# Patient Record
Sex: FEMALE | Race: WHITE | Employment: FULL TIME | URBAN - METROPOLITAN AREA
[De-identification: names, ages, dates, MRNs, and addresses within clinical notes are randomized per-mention and may not be internally consistent; named-entity substitution may affect disease eponyms.]

---

## 2017-05-04 ENCOUNTER — HOSPITAL ENCOUNTER (EMERGENCY)
Age: 27
Discharge: HOME | End: 2017-05-04
Attending: EMERGENCY MEDICINE
Payer: COMMERCIAL

## 2017-05-04 VITALS
DIASTOLIC BLOOD PRESSURE: 65 MMHG | SYSTOLIC BLOOD PRESSURE: 107 MMHG | HEART RATE: 54 BPM | RESPIRATION RATE: 18 BRPM | OXYGEN SATURATION: 99 % | TEMPERATURE: 97 F

## 2017-05-04 DIAGNOSIS — R10.12 ABDOMINAL PAIN, LEFT UPPER QUADRANT: Primary | ICD-10-CM

## 2017-05-04 LAB
-: ABNORMAL
ABSOLUTE EOS #: 0 K/UL (ref 0–0.4)
ABSOLUTE LYMPH #: 0.9 K/UL (ref 1–4.8)
ABSOLUTE MONO #: 0.3 K/UL (ref 0.1–1.2)
ALBUMIN SERPL-MCNC: 4.6 G/DL (ref 3.5–5.2)
ALBUMIN/GLOBULIN RATIO: 1.8 (ref 1–2.5)
ALP BLD-CCNC: 68 U/L (ref 35–104)
ALT SERPL-CCNC: <5 U/L (ref 5–33)
AMORPHOUS: ABNORMAL
ANION GAP SERPL CALCULATED.3IONS-SCNC: 14 MMOL/L (ref 9–17)
AST SERPL-CCNC: 14 U/L
BACTERIA: ABNORMAL
BASOPHILS # BLD: 0 %
BASOPHILS ABSOLUTE: 0 K/UL (ref 0–0.2)
BILIRUB SERPL-MCNC: 0.92 MG/DL (ref 0.3–1.2)
BILIRUBIN DIRECT: 0.22 MG/DL
BILIRUBIN URINE: NEGATIVE
BILIRUBIN, INDIRECT: 0.7 MG/DL (ref 0–1)
BUN BLDV-MCNC: 15 MG/DL (ref 6–20)
BUN/CREAT BLD: NORMAL (ref 9–20)
CALCIUM SERPL-MCNC: 9.7 MG/DL (ref 8.6–10.4)
CASTS UA: ABNORMAL /LPF (ref 0–8)
CHLORIDE BLD-SCNC: 98 MMOL/L (ref 98–107)
CO2: 26 MMOL/L (ref 20–31)
COLOR: YELLOW
COMMENT UA: ABNORMAL
CREAT SERPL-MCNC: 0.8 MG/DL (ref 0.5–0.9)
CRYSTALS, UA: ABNORMAL /HPF
DIFFERENTIAL TYPE: ABNORMAL
EOSINOPHILS RELATIVE PERCENT: 1 %
EPITHELIAL CELLS UA: ABNORMAL /HPF (ref 0–5)
GFR AFRICAN AMERICAN: >60 ML/MIN
GFR NON-AFRICAN AMERICAN: >60 ML/MIN
GFR SERPL CREATININE-BSD FRML MDRD: NORMAL ML/MIN/{1.73_M2}
GFR SERPL CREATININE-BSD FRML MDRD: NORMAL ML/MIN/{1.73_M2}
GLOBULIN: ABNORMAL G/DL (ref 1.5–3.8)
GLUCOSE BLD-MCNC: 94 MG/DL (ref 70–99)
GLUCOSE URINE: NEGATIVE
HCT VFR BLD CALC: 41.1 % (ref 36–46)
HEMOGLOBIN: 14.2 G/DL (ref 12–16)
KETONES, URINE: NEGATIVE
LEUKOCYTE ESTERASE, URINE: ABNORMAL
LIPASE: 76 U/L (ref 13–60)
LYMPHOCYTES # BLD: 15 %
MCH RBC QN AUTO: 32.2 PG (ref 26–34)
MCHC RBC AUTO-ENTMCNC: 34.6 G/DL (ref 31–37)
MCV RBC AUTO: 92.9 FL (ref 80–100)
MONOCYTES # BLD: 6 %
MUCUS: ABNORMAL
NITRITE, URINE: NEGATIVE
OTHER OBSERVATIONS UA: ABNORMAL
PDW BLD-RTO: 13.9 % (ref 12.5–15.4)
PH UA: 7.5 (ref 5–8)
PLATELET # BLD: 231 K/UL (ref 140–450)
PLATELET ESTIMATE: ABNORMAL
PMV BLD AUTO: 8 FL (ref 6–12)
POTASSIUM SERPL-SCNC: 4.3 MMOL/L (ref 3.7–5.3)
PROTEIN UA: NEGATIVE
RBC # BLD: 4.43 M/UL (ref 4–5.2)
RBC # BLD: ABNORMAL 10*6/UL
RBC UA: ABNORMAL /HPF (ref 0–4)
RENAL EPITHELIAL, UA: ABNORMAL /HPF
SEG NEUTROPHILS: 78 %
SEGMENTED NEUTROPHILS ABSOLUTE COUNT: 4.6 K/UL (ref 1.8–7.7)
SODIUM BLD-SCNC: 138 MMOL/L (ref 135–144)
SPECIFIC GRAVITY UA: 1 (ref 1–1.03)
TOTAL PROTEIN: 7.2 G/DL (ref 6.4–8.3)
TRICHOMONAS: ABNORMAL
TURBIDITY: CLEAR
URINE HGB: NEGATIVE
UROBILINOGEN, URINE: NORMAL
WBC # BLD: 5.9 K/UL (ref 3.5–11)
WBC # BLD: ABNORMAL 10*3/UL
WBC UA: ABNORMAL /HPF (ref 0–5)
YEAST: ABNORMAL

## 2017-05-04 PROCEDURE — 6370000000 HC RX 637 (ALT 250 FOR IP): Performed by: EMERGENCY MEDICINE

## 2017-05-04 PROCEDURE — 80048 BASIC METABOLIC PNL TOTAL CA: CPT

## 2017-05-04 PROCEDURE — 80076 HEPATIC FUNCTION PANEL: CPT

## 2017-05-04 PROCEDURE — 99284 EMERGENCY DEPT VISIT MOD MDM: CPT

## 2017-05-04 PROCEDURE — 81001 URINALYSIS AUTO W/SCOPE: CPT

## 2017-05-04 PROCEDURE — 83690 ASSAY OF LIPASE: CPT

## 2017-05-04 PROCEDURE — 87086 URINE CULTURE/COLONY COUNT: CPT

## 2017-05-04 PROCEDURE — 85025 COMPLETE CBC W/AUTO DIFF WBC: CPT

## 2017-05-04 RX ORDER — METOCLOPRAMIDE 10 MG/1
10 TABLET ORAL
Qty: 30 TABLET | Refills: 0 | Status: SHIPPED | OUTPATIENT
Start: 2017-05-04 | End: 2021-12-27

## 2017-05-04 RX ORDER — METOCLOPRAMIDE 10 MG/1
10 TABLET ORAL
Status: DISCONTINUED | OUTPATIENT
Start: 2017-05-04 | End: 2017-05-04 | Stop reason: HOSPADM

## 2017-05-04 RX ORDER — CEPHALEXIN 250 MG/1
250 CAPSULE ORAL 4 TIMES DAILY
Qty: 28 CAPSULE | Refills: 0 | Status: SHIPPED | OUTPATIENT
Start: 2017-05-04 | End: 2021-12-27

## 2017-05-04 RX ADMIN — METOCLOPRAMIDE 10 MG: 10 TABLET ORAL at 16:22

## 2017-05-04 ASSESSMENT — ENCOUNTER SYMPTOMS
CONSTIPATION: 0
SHORTNESS OF BREATH: 0
SORE THROAT: 0
ABDOMINAL DISTENTION: 0
VOMITING: 0
ABDOMINAL PAIN: 1
NAUSEA: 0
COUGH: 0
EYE PAIN: 0
BLOOD IN STOOL: 0
DIARRHEA: 0
EYE DISCHARGE: 0
ANAL BLEEDING: 0

## 2017-05-04 ASSESSMENT — PAIN DESCRIPTION - DESCRIPTORS: DESCRIPTORS: PRESSURE;DISCOMFORT

## 2017-05-04 ASSESSMENT — PAIN DESCRIPTION - LOCATION: LOCATION: ABDOMEN

## 2017-05-04 ASSESSMENT — PAIN SCALES - GENERAL: PAINLEVEL_OUTOF10: 7

## 2017-05-06 LAB
CULTURE: NORMAL
CULTURE: NORMAL
Lab: NORMAL
SPECIMEN DESCRIPTION: NORMAL
STATUS: NORMAL

## 2022-06-17 ENCOUNTER — HOSPITAL ENCOUNTER (OUTPATIENT)
Age: 32
Discharge: HOME | End: 2022-06-17
Payer: COMMERCIAL

## 2022-06-17 DIAGNOSIS — Z00.00 WELL ADULT EXAM: ICD-10-CM

## 2022-06-17 LAB
ABSOLUTE EOS #: 0.09 K/UL (ref 0–0.44)
ABSOLUTE IMMATURE GRANULOCYTE: <0.03 K/UL (ref 0–0.3)
ABSOLUTE LYMPH #: 1.5 K/UL (ref 1.1–3.7)
ABSOLUTE MONO #: 0.43 K/UL (ref 0.1–1.2)
ALBUMIN SERPL-MCNC: 4.4 G/DL (ref 3.5–5.2)
ALBUMIN/GLOBULIN RATIO: 1.8 (ref 1–2.5)
ALP BLD-CCNC: 64 U/L (ref 35–104)
ALT SERPL-CCNC: 11 U/L (ref 5–33)
ANION GAP SERPL CALCULATED.3IONS-SCNC: 15 MMOL/L (ref 9–17)
AST SERPL-CCNC: 15 U/L
BASOPHILS # BLD: 1 % (ref 0–2)
BASOPHILS ABSOLUTE: 0.04 K/UL (ref 0–0.2)
BILIRUB SERPL-MCNC: 0.31 MG/DL (ref 0.3–1.2)
BUN BLDV-MCNC: 11 MG/DL (ref 6–20)
CALCIUM SERPL-MCNC: 9.3 MG/DL (ref 8.6–10.4)
CHLORIDE BLD-SCNC: 103 MMOL/L (ref 98–107)
CHOLESTEROL/HDL RATIO: 2.7
CHOLESTEROL: 174 MG/DL
CO2: 21 MMOL/L (ref 20–31)
CREAT SERPL-MCNC: 0.81 MG/DL (ref 0.5–0.9)
EOSINOPHILS RELATIVE PERCENT: 2 % (ref 1–4)
GFR AFRICAN AMERICAN: >60 ML/MIN
GFR NON-AFRICAN AMERICAN: >60 ML/MIN
GFR SERPL CREATININE-BSD FRML MDRD: NORMAL ML/MIN/{1.73_M2}
GLUCOSE FASTING: 76 MG/DL (ref 70–99)
HCT VFR BLD CALC: 35.3 % (ref 36.3–47.1)
HDLC SERPL-MCNC: 65 MG/DL
HEMOGLOBIN: 11.3 G/DL (ref 11.9–15.1)
IMMATURE GRANULOCYTES: 0 %
LDL CHOLESTEROL: 95 MG/DL (ref 0–130)
LYMPHOCYTES # BLD: 28 % (ref 24–43)
MCH RBC QN AUTO: 27.8 PG (ref 25.2–33.5)
MCHC RBC AUTO-ENTMCNC: 32 G/DL (ref 28.4–34.8)
MCV RBC AUTO: 86.7 FL (ref 82.6–102.9)
MONOCYTES # BLD: 8 % (ref 3–12)
NRBC AUTOMATED: 0 PER 100 WBC
PDW BLD-RTO: 13.2 % (ref 11.8–14.4)
PLATELET # BLD: 301 K/UL (ref 138–453)
PMV BLD AUTO: 10.7 FL (ref 8.1–13.5)
POTASSIUM SERPL-SCNC: 4.3 MMOL/L (ref 3.7–5.3)
RBC # BLD: 4.07 M/UL (ref 3.95–5.11)
SEG NEUTROPHILS: 61 % (ref 36–65)
SEGMENTED NEUTROPHILS ABSOLUTE COUNT: 3.36 K/UL (ref 1.5–8.1)
SODIUM BLD-SCNC: 139 MMOL/L (ref 135–144)
TOTAL PROTEIN: 6.9 G/DL (ref 6.4–8.3)
TRIGL SERPL-MCNC: 69 MG/DL
TSH SERPL DL<=0.05 MIU/L-ACNC: 2.71 UIU/ML (ref 0.3–5)
VITAMIN B-12: 404 PG/ML (ref 232–1245)
VITAMIN D 25-HYDROXY: 25.7 NG/ML
WBC # BLD: 5.4 K/UL (ref 3.5–11.3)

## 2022-06-17 PROCEDURE — 82607 VITAMIN B-12: CPT

## 2022-06-17 PROCEDURE — 84436 ASSAY OF TOTAL THYROXINE: CPT

## 2022-06-17 PROCEDURE — 85025 COMPLETE CBC W/AUTO DIFF WBC: CPT

## 2022-06-17 PROCEDURE — 80061 LIPID PANEL: CPT

## 2022-06-17 PROCEDURE — 80053 COMPREHEN METABOLIC PANEL: CPT

## 2022-06-17 PROCEDURE — 36415 COLL VENOUS BLD VENIPUNCTURE: CPT

## 2022-06-17 PROCEDURE — 84443 ASSAY THYROID STIM HORMONE: CPT

## 2022-06-17 PROCEDURE — 82306 VITAMIN D 25 HYDROXY: CPT

## 2022-06-18 LAB — T4 TOTAL: 6.3 UG/DL (ref 4.5–10.9)

## 2022-08-05 ENCOUNTER — HOSPITAL ENCOUNTER (OUTPATIENT)
Age: 32
Discharge: HOME | End: 2022-08-05
Payer: COMMERCIAL

## 2022-08-05 DIAGNOSIS — D50.9 IRON DEFICIENCY ANEMIA, UNSPECIFIED IRON DEFICIENCY ANEMIA TYPE: ICD-10-CM

## 2022-08-05 LAB
ABSOLUTE EOS #: <0.03 K/UL (ref 0–0.44)
ABSOLUTE IMMATURE GRANULOCYTE: 0.03 K/UL (ref 0–0.3)
ABSOLUTE LYMPH #: 1.11 K/UL (ref 1.1–3.7)
ABSOLUTE MONO #: 0.2 K/UL (ref 0.1–1.2)
BASOPHILS # BLD: 0 % (ref 0–2)
BASOPHILS ABSOLUTE: <0.03 K/UL (ref 0–0.2)
EOSINOPHILS RELATIVE PERCENT: 0 % (ref 1–4)
HCT VFR BLD CALC: 38.3 % (ref 36.3–47.1)
HEMOGLOBIN: 12.1 G/DL (ref 11.9–15.1)
IMMATURE GRANULOCYTES: 0 %
LYMPHOCYTES # BLD: 12 % (ref 24–43)
MCH RBC QN AUTO: 27.1 PG (ref 25.2–33.5)
MCHC RBC AUTO-ENTMCNC: 31.6 G/DL (ref 28.4–34.8)
MCV RBC AUTO: 85.7 FL (ref 82.6–102.9)
MONOCYTES # BLD: 2 % (ref 3–12)
NRBC AUTOMATED: 0 PER 100 WBC
PDW BLD-RTO: 13.6 % (ref 11.8–14.4)
PLATELET # BLD: 360 K/UL (ref 138–453)
PMV BLD AUTO: 10.8 FL (ref 8.1–13.5)
RBC # BLD: 4.47 M/UL (ref 3.95–5.11)
SEG NEUTROPHILS: 86 % (ref 36–65)
SEGMENTED NEUTROPHILS ABSOLUTE COUNT: 8 K/UL (ref 1.5–8.1)
WBC # BLD: 9.4 K/UL (ref 3.5–11.3)

## 2022-08-05 PROCEDURE — 83540 ASSAY OF IRON: CPT

## 2022-08-05 PROCEDURE — 36415 COLL VENOUS BLD VENIPUNCTURE: CPT

## 2022-08-05 PROCEDURE — 82728 ASSAY OF FERRITIN: CPT

## 2022-08-05 PROCEDURE — 83550 IRON BINDING TEST: CPT

## 2022-08-05 PROCEDURE — 85025 COMPLETE CBC W/AUTO DIFF WBC: CPT

## 2022-08-05 PROCEDURE — 82746 ASSAY OF FOLIC ACID SERUM: CPT

## 2022-08-05 PROCEDURE — 82607 VITAMIN B-12: CPT

## 2022-08-06 LAB
FERRITIN: 12 NG/ML (ref 13–150)
FOLATE: 13.2 NG/ML
IRON SATURATION: 7 % (ref 20–55)
IRON: 28 UG/DL (ref 37–145)
TOTAL IRON BINDING CAPACITY: 403 UG/DL (ref 250–450)
UNSATURATED IRON BINDING CAPACITY: 375 UG/DL (ref 112–347)
VITAMIN B-12: 453 PG/ML (ref 232–1245)

## 2022-11-15 ENCOUNTER — OFFICE VISIT (OUTPATIENT)
Dept: PODIATRY | Age: 32
End: 2022-11-15
Payer: COMMERCIAL

## 2022-11-15 VITALS — HEIGHT: 68 IN | BODY MASS INDEX: 28.79 KG/M2 | WEIGHT: 190 LBS

## 2022-11-15 DIAGNOSIS — S90.211A SUBUNGUAL HEMATOMA OF GREAT TOE OF RIGHT FOOT, INITIAL ENCOUNTER: Primary | ICD-10-CM

## 2022-11-15 DIAGNOSIS — L60.1 ONYCHOLYSIS: ICD-10-CM

## 2022-11-15 PROCEDURE — G8484 FLU IMMUNIZE NO ADMIN: HCPCS | Performed by: PODIATRIST

## 2022-11-15 PROCEDURE — 99203 OFFICE O/P NEW LOW 30 MIN: CPT | Performed by: PODIATRIST

## 2022-11-15 PROCEDURE — G8427 DOCREV CUR MEDS BY ELIG CLIN: HCPCS | Performed by: PODIATRIST

## 2022-11-15 PROCEDURE — G8419 CALC BMI OUT NRM PARAM NOF/U: HCPCS | Performed by: PODIATRIST

## 2022-11-15 PROCEDURE — 1036F TOBACCO NON-USER: CPT | Performed by: PODIATRIST

## 2022-11-15 NOTE — PROGRESS NOTES
[address].  Dept: [phone]    NEW PATIENT PROGRESS NOTE  Date of patient's visit: 11/15/2022  Patient's Name:  Sallie Henderson YOB: 1990            Patient Care Team:  Good Grant MD as PCP - General (Family Medicine)  Good Grant MD as PCP - [facility]        Chief Complaint   Patient presents with    New Patient     Establish care    Toe Pain     Right great toe    Nail Problem     Present for 1 month         HPI:   Sallie Henderson is a 29 y.o. female who presents to the office today complaining of right great toe pain and nail issues. States she ran a marathon and has noticed discoloration of nails. Symptoms began 1 month(s) ago. Patient relates pain is Absent . Pain is rated 0 out of 10 and is described as none. Treatments prior to today's visit include: none. Currently denies F/C/N/V. Pt's primary care physician is Good Grant MD last seen may 27 2022     No Known Allergies    History reviewed. No pertinent past medical history. Prior to Admission medications    Medication Sig Start Date End Date Taking? Authorizing Provider   vitamin D (ERGOCALCIFEROL) 1.25 MG (71917 UT) CAPS capsule Take 1 capsule by mouth once a week 6/21/22  Yes Good Grant MD       History reviewed. No pertinent surgical history. Family History   Problem Relation Age of Onset    Arthritis Father     High Cholesterol Father        Social History     Tobacco Use    Smoking status: Never    Smokeless tobacco: Never   Substance Use Topics    Alcohol use:  Yes     Alcohol/week: 0.0 standard drinks     Comment: very rare occasions       Review of Systems    Review of Systems:   History obtained from chart review and the patient  General ROS: negative for - chills, fatigue, fever, night sweats or weight gain  Constitutional: Negative for chills, diaphoresis, fatigue, fever and unexpected weight change. Musculoskeletal: Positive for arthralgias, gait problem and joint swelling. Neurological ROS: negative for - behavioral changes, confusion, headaches or seizures. Negative for weakness and numbness. Dermatological ROS: negative for - mole changes, rash  Cardiovascular: Negative for leg swelling. Gastrointestinal: Negative for constipation, diarrhea, nausea and vomiting. Lower Extremity Physical Examination:   Vitals: There were no vitals filed for this visit. General: AAO x 3 in NAD. Dermatologic Exam:  Right hallux and 2nd digit nail and left hallux nails is dark in color with subungual hematoma. Musculoskeletal:     1st MPJ ROM decreased, Bilateral.  Muscle strength 5/5, Bilateral.  Pain present upon palpation of right hallux. Medial longitudinal arch, Bilateral WNL. Ankle ROM WNL,Bilateral.    Dorsally contracted digits absent digits 1-5 Bilateral.     Vascular: DP and PT pulses palpable 2/4, Bilateral.  CFT <3 seconds, Bilateral.  Hair growth present to the level of the digits, Bilateral.  Edema absent, Bilateral.  Varicosities absent, Bilateral. Erythema absent, Bilateral    Neurological: Sensation intact to light touch to level of digits, Bilateral.  Protective sensation intact 10/10 sites via 5.07/10g [place]-Christopher Monofilament, Bilateral.  negative Tinel's, Bilateral.  negative Valleix sign, Bilateral.      Integument: Warm, dry, supple, Bilateral.  Open lesion absent, Bilateral.  Interdigital maceration absent to web spaces 1-4, Bilateral.  Nails are normal in length, thickness and color 1-5 bilateral.  Fissures absent, Bilateral.     Asessment: Patient is a 29 y.o. female with:    Diagnosis Orders   1. Subungual hematoma of great toe of right foot, initial encounter        2. Onycholysis              Plan: Patient examined and evaluated. Current condition and treatment options discussed in detail.   Discussed conservative and surgical options with the patient. Advised pt to avoid narrow shoes. Pt to monitor for infection. Advised pt to consider nail avulsion at next visit if symptoms persist or worsen. Pt to monitor for increased signs of infection such as erythema, edema and drainage. All labs were reviewed and all imagining including the above findings were reviewed PRIOR to the patients arrival and with the patient today. Previous patient encounter was reviewed. Encounters from the patients other medical providers were reviewed and noted. Time was spent educating the patient on proper care of the feet and ankles. All the above diagnosis were addressed at todays visit and all questions were answered. A total of 30 minutes was spent with this patients encounter which included charting after the patients visit      Verbal and written instructions given to patient. Contact office with any questions/problems/concerns.   RTC in PRN  11/15/2022    Electronically signed by Gema Rodriguez DPM on 11/15/2022 at 3:14 PM  11/15/2022

## 2023-06-19 ENCOUNTER — HOSPITAL ENCOUNTER (OUTPATIENT)
Age: 33
Discharge: HOME | End: 2023-06-19
Payer: COMMERCIAL

## 2023-06-19 DIAGNOSIS — Z00.00 WELL ADULT EXAM: ICD-10-CM

## 2023-06-19 DIAGNOSIS — D50.8 OTHER IRON DEFICIENCY ANEMIA: ICD-10-CM

## 2023-06-19 LAB
25(OH)D3 SERPL-MCNC: 39.5 NG/ML
ALBUMIN SERPL-MCNC: 4.3 G/DL (ref 3.5–5.2)
ALBUMIN/GLOB SERPL: 1.7 {RATIO} (ref 1–2.5)
ALP SERPL-CCNC: 68 U/L (ref 35–104)
ALT SERPL-CCNC: 13 U/L (ref 5–33)
ANION GAP SERPL CALCULATED.3IONS-SCNC: 10 MMOL/L (ref 9–17)
AST SERPL-CCNC: 18 U/L
BASOPHILS # BLD: 0.04 K/UL (ref 0–0.2)
BASOPHILS NFR BLD: 1 % (ref 0–2)
BILIRUB SERPL-MCNC: 0.6 MG/DL (ref 0.3–1.2)
BUN SERPL-MCNC: 9 MG/DL (ref 6–20)
CALCIUM SERPL-MCNC: 9.6 MG/DL (ref 8.6–10.4)
CHLORIDE SERPL-SCNC: 102 MMOL/L (ref 98–107)
CHOLEST SERPL-MCNC: 176 MG/DL
CHOLESTEROL/HDL RATIO: 2.9
CO2 SERPL-SCNC: 25 MMOL/L (ref 20–31)
CREAT SERPL-MCNC: 0.91 MG/DL (ref 0.5–0.9)
EOSINOPHIL # BLD: 0.09 K/UL (ref 0–0.44)
EOSINOPHILS RELATIVE PERCENT: 1 % (ref 1–4)
ERYTHROCYTE [DISTWIDTH] IN BLOOD BY AUTOMATED COUNT: 12 % (ref 11.8–14.4)
FERRITIN SERPL-MCNC: 28 NG/ML (ref 13–150)
GFR SERPL CREATININE-BSD FRML MDRD: >60 ML/MIN/1.73M2
GLUCOSE P FAST SERPL-MCNC: 79 MG/DL (ref 70–99)
HCT VFR BLD AUTO: 40.8 % (ref 36.3–47.1)
HDLC SERPL-MCNC: 61 MG/DL
HGB BLD-MCNC: 13.9 G/DL (ref 11.9–15.1)
IMM GRANULOCYTES # BLD AUTO: <0.03 K/UL (ref 0–0.3)
IMM GRANULOCYTES NFR BLD: 0 %
IRON SATN MFR SERPL: 28 % (ref 20–55)
IRON SERPL-MCNC: 95 UG/DL (ref 37–145)
LDLC SERPL CALC-MCNC: 94 MG/DL (ref 0–130)
LYMPHOCYTES # BLD: 28 % (ref 24–43)
LYMPHOCYTES NFR BLD: 1.84 K/UL (ref 1.1–3.7)
MCH RBC QN AUTO: 31.2 PG (ref 25.2–33.5)
MCHC RBC AUTO-ENTMCNC: 34.1 G/DL (ref 28.4–34.8)
MCV RBC AUTO: 91.5 FL (ref 82.6–102.9)
MONOCYTES NFR BLD: 0.45 K/UL (ref 0.1–1.2)
MONOCYTES NFR BLD: 7 % (ref 3–12)
NEUTROPHILS NFR BLD: 63 % (ref 36–65)
NEUTS SEG NFR BLD: 4.17 K/UL (ref 1.5–8.1)
NRBC AUTOMATED: 0 PER 100 WBC
PLATELET # BLD AUTO: 284 K/UL (ref 138–453)
PMV BLD AUTO: 10.5 FL (ref 8.1–13.5)
POTASSIUM SERPL-SCNC: 4.6 MMOL/L (ref 3.7–5.3)
PROT SERPL-MCNC: 6.9 G/DL (ref 6.4–8.3)
RBC # BLD AUTO: 4.46 M/UL (ref 3.95–5.11)
SODIUM SERPL-SCNC: 137 MMOL/L (ref 135–144)
T4 FREE SERPL-MCNC: 1.2 NG/DL (ref 0.9–1.7)
TIBC SERPL-MCNC: 340 UG/DL (ref 250–450)
TRIGL SERPL-MCNC: 106 MG/DL
TSH SERPL-MCNC: 2.64 UIU/ML (ref 0.3–5)
UNSATURATED IRON BINDING CAPACITY: 245 UG/DL (ref 112–347)
VIT B12 SERPL-MCNC: 597 PG/ML (ref 232–1245)
WBC OTHER # BLD: 6.6 K/UL (ref 3.5–11.3)

## 2023-06-19 PROCEDURE — 83540 ASSAY OF IRON: CPT

## 2023-06-19 PROCEDURE — 84439 ASSAY OF FREE THYROXINE: CPT

## 2023-06-19 PROCEDURE — 80061 LIPID PANEL: CPT

## 2023-06-19 PROCEDURE — 36415 COLL VENOUS BLD VENIPUNCTURE: CPT

## 2023-06-19 PROCEDURE — 85027 COMPLETE CBC AUTOMATED: CPT

## 2023-06-19 PROCEDURE — 82607 VITAMIN B-12: CPT

## 2023-06-19 PROCEDURE — 80053 COMPREHEN METABOLIC PANEL: CPT

## 2023-06-19 PROCEDURE — 82728 ASSAY OF FERRITIN: CPT

## 2023-06-19 PROCEDURE — 83550 IRON BINDING TEST: CPT

## 2023-06-19 PROCEDURE — 84443 ASSAY THYROID STIM HORMONE: CPT

## 2023-06-19 PROCEDURE — 82306 VITAMIN D 25 HYDROXY: CPT

## 2023-07-05 ENCOUNTER — HOSPITAL ENCOUNTER (OUTPATIENT)
Age: 33
Discharge: HOME | End: 2023-07-05
Payer: COMMERCIAL

## 2023-07-05 ENCOUNTER — E-VISIT (OUTPATIENT)
Dept: PRIMARY CARE CLINIC | Age: 33
End: 2023-07-05
Payer: COMMERCIAL

## 2023-07-05 DIAGNOSIS — R30.0 DYSURIA: ICD-10-CM

## 2023-07-05 DIAGNOSIS — R30.0 DYSURIA: Primary | ICD-10-CM

## 2023-07-05 PROCEDURE — 99423 OL DIG E/M SVC 21+ MIN: CPT | Performed by: NURSE PRACTITIONER

## 2023-07-05 PROCEDURE — 86403 PARTICLE AGGLUT ANTBDY SCRN: CPT

## 2023-07-05 PROCEDURE — 87086 URINE CULTURE/COLONY COUNT: CPT

## 2023-07-05 NOTE — PROGRESS NOTES
Sandi Brown (1990) initiated an asynchronous digital communication through [address] [place] [address]. HPI: per patient questionnaire     Exam: not applicable    Diagnoses and all orders for this visit:  Diagnoses and all orders for this visit:    Dysuria  -     Culture, Urine; Future    Patient had similar symptoms a few months ago that went away on their own. Recommend urine culture. We will follow-up with results. Increase oral fluids. Tylenol, Motrin or Azo for pain. Follow-up PCP as needed    Time: EV3 - 21 or more minutes were spent on the digital evaluation and management of this patient.  22 min    Mariaa Martinez, APRN - CNP

## 2023-07-06 ENCOUNTER — TELEPHONE (OUTPATIENT)
Dept: PRIMARY CARE CLINIC | Age: 33
End: 2023-07-06

## 2023-07-06 LAB
MICROORGANISM SPEC CULT: ABNORMAL
SPECIMEN DESCRIPTION: ABNORMAL

## 2023-07-06 RX ORDER — CEPHALEXIN 500 MG/1
500 CAPSULE ORAL 2 TIMES DAILY
Qty: 10 CAPSULE | Refills: 0 | Status: SHIPPED | OUTPATIENT
Start: 2023-07-06 | End: 2023-07-11

## 2023-07-06 NOTE — TELEPHONE ENCOUNTER
Patient called stating she had a E visit done yesterday with Shima Weber and labs were ordered. Patient would like results for labs please.

## 2024-06-24 ENCOUNTER — HOSPITAL ENCOUNTER (OUTPATIENT)
Age: 34
Discharge: HOME | End: 2024-06-24
Payer: COMMERCIAL

## 2024-06-24 DIAGNOSIS — Z00.00 WELL ADULT EXAM: ICD-10-CM

## 2024-06-24 LAB
25(OH)D3 SERPL-MCNC: 28 NG/ML (ref 30–100)
ALBUMIN SERPL-MCNC: 4.6 G/DL (ref 3.5–5.2)
ALBUMIN/GLOB SERPL: 2 {RATIO} (ref 1–2.5)
ALP SERPL-CCNC: 74 U/L (ref 35–104)
ALT SERPL-CCNC: 12 U/L (ref 10–35)
ANION GAP SERPL CALCULATED.3IONS-SCNC: 8 MMOL/L (ref 9–16)
AST SERPL-CCNC: 23 U/L (ref 10–35)
BASOPHILS # BLD: 0.04 K/UL (ref 0–0.2)
BASOPHILS NFR BLD: 1 % (ref 0–2)
BILIRUB SERPL-MCNC: 0.5 MG/DL (ref 0–1.2)
BUN SERPL-MCNC: 9 MG/DL (ref 6–20)
CALCIUM SERPL-MCNC: 9.3 MG/DL (ref 8.6–10.4)
CHLORIDE SERPL-SCNC: 103 MMOL/L (ref 98–107)
CHOLEST SERPL-MCNC: 168 MG/DL (ref 0–199)
CHOLESTEROL/HDL RATIO: 3
CO2 SERPL-SCNC: 27 MMOL/L (ref 20–31)
CREAT SERPL-MCNC: 0.9 MG/DL (ref 0.5–0.9)
EOSINOPHIL # BLD: 0.12 K/UL (ref 0–0.44)
EOSINOPHILS RELATIVE PERCENT: 2 % (ref 1–4)
ERYTHROCYTE [DISTWIDTH] IN BLOOD BY AUTOMATED COUNT: 11.9 % (ref 11.8–14.4)
GFR, ESTIMATED: 86 ML/MIN/1.73M2
GLUCOSE P FAST SERPL-MCNC: 79 MG/DL (ref 74–99)
HCT VFR BLD AUTO: 41.9 % (ref 36.3–47.1)
HDLC SERPL-MCNC: 65 MG/DL
HGB BLD-MCNC: 14.4 G/DL (ref 11.9–15.1)
IMM GRANULOCYTES # BLD AUTO: <0.03 K/UL (ref 0–0.3)
IMM GRANULOCYTES NFR BLD: 0 %
LDLC SERPL CALC-MCNC: 88 MG/DL (ref 0–100)
LYMPHOCYTES NFR BLD: 2.07 K/UL (ref 1.1–3.7)
LYMPHOCYTES RELATIVE PERCENT: 31 % (ref 24–43)
MCH RBC QN AUTO: 30.9 PG (ref 25.2–33.5)
MCHC RBC AUTO-ENTMCNC: 34.4 G/DL (ref 28.4–34.8)
MCV RBC AUTO: 89.9 FL (ref 82.6–102.9)
MONOCYTES NFR BLD: 0.54 K/UL (ref 0.1–1.2)
MONOCYTES NFR BLD: 8 % (ref 3–12)
NEUTROPHILS NFR BLD: 58 % (ref 36–65)
NEUTS SEG NFR BLD: 3.87 K/UL (ref 1.5–8.1)
NRBC BLD-RTO: 0 PER 100 WBC
PLATELET # BLD AUTO: 254 K/UL (ref 138–453)
PMV BLD AUTO: 10.3 FL (ref 8.1–13.5)
POTASSIUM SERPL-SCNC: 4.1 MMOL/L (ref 3.7–5.3)
PROT SERPL-MCNC: 7.1 G/DL (ref 6.6–8.7)
RBC # BLD AUTO: 4.66 M/UL (ref 3.95–5.11)
SODIUM SERPL-SCNC: 138 MMOL/L (ref 136–145)
T4 SERPL-MCNC: 8 UG/DL (ref 4.5–11.7)
TRIGL SERPL-MCNC: 78 MG/DL
TSH SERPL DL<=0.05 MIU/L-ACNC: 2.33 UIU/ML (ref 0.27–4.2)
VIT B12 SERPL-MCNC: 562 PG/ML (ref 232–1245)
VLDLC SERPL CALC-MCNC: 16 MG/DL
WBC OTHER # BLD: 6.7 K/UL (ref 3.5–11.3)

## 2024-06-24 PROCEDURE — 80061 LIPID PANEL: CPT

## 2024-06-24 PROCEDURE — 82607 VITAMIN B-12: CPT

## 2024-06-24 PROCEDURE — 80053 COMPREHEN METABOLIC PANEL: CPT

## 2024-06-24 PROCEDURE — 84436 ASSAY OF TOTAL THYROXINE: CPT

## 2024-06-24 PROCEDURE — 84443 ASSAY THYROID STIM HORMONE: CPT

## 2024-06-24 PROCEDURE — 82306 VITAMIN D 25 HYDROXY: CPT

## 2024-06-24 PROCEDURE — 36415 COLL VENOUS BLD VENIPUNCTURE: CPT

## 2024-06-24 PROCEDURE — 85025 COMPLETE CBC W/AUTO DIFF WBC: CPT

## 2025-07-08 ENCOUNTER — OFFICE VISIT (OUTPATIENT)
Age: 35
End: 2025-07-08

## 2025-07-08 VITALS
OXYGEN SATURATION: 99 % | BODY MASS INDEX: 29.97 KG/M2 | WEIGHT: 200 LBS | DIASTOLIC BLOOD PRESSURE: 73 MMHG | SYSTOLIC BLOOD PRESSURE: 125 MMHG | HEART RATE: 73 BPM

## 2025-07-08 DIAGNOSIS — L23.9 ALLERGIC CONTACT DERMATITIS, UNSPECIFIED TRIGGER: Primary | ICD-10-CM
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
## 2025-07-08 PROBLEM — K58.1 IRRITABLE BOWEL SYNDROME WITH CONSTIPATION: Status: ACTIVE | Noted: 2017-04-04

## 2025-07-08 RX ORDER — TRIAMCINOLONE ACETONIDE 5 MG/G
OINTMENT TOPICAL
Qty: 45 G | Refills: 0 | Status: SHIPPED | OUTPATIENT
Start: 2025-07-08 | End: 2025-07-15

## 2025-07-08 RX ORDER — DEXAMETHASONE SODIUM PHOSPHATE 10 MG/ML
10 INJECTION, SOLUTION INTRA-ARTICULAR; INTRALESIONAL; INTRAMUSCULAR; INTRAVENOUS; SOFT TISSUE ONCE
Status: COMPLETED | OUTPATIENT
Start: 2025-07-08 | End: 2025-07-08

## 2025-07-08 RX ORDER — FAMOTIDINE 20 MG/1
20 TABLET, FILM COATED ORAL 2 TIMES DAILY
Qty: 10 TABLET | Refills: 0 | Status: SHIPPED | OUTPATIENT
Start: 2025-07-08 | End: 2025-07-13

## 2025-07-08 RX ADMIN — DEXAMETHASONE SODIUM PHOSPHATE 10 MG: 10 INJECTION, SOLUTION INTRA-ARTICULAR; INTRALESIONAL; INTRAMUSCULAR; INTRAVENOUS; SOFT TISSUE at 19:26

## 2025-07-08 ASSESSMENT — ENCOUNTER SYMPTOMS
RESPIRATORY NEGATIVE: 1
RHINORRHEA: 0
SHORTNESS OF BREATH: 0
EYE PAIN: 0

## 2025-07-08 NOTE — PROGRESS NOTES
[facility], [facility] ([facility])  [facility] [place] [id number]  Dept: [phone]    Date: 25    Visit date not found      Tasha Gallo (:  1990) is a 34 y.o. female, here for evaluation of the following chief complaint(s):  Rash (Started )      HPI    History provided by:  Patient  Rash  This is a new problem. Episode onset: 2 days. The problem has been gradually worsening since onset. The rash is diffuse. The rash is characterized by itchiness, redness and swelling. She was exposed to an insect bite/sting. Pertinent negatives include no eye pain, facial edema, fatigue, fever, rhinorrhea or shortness of breath. Past treatments include anti-itch cream and antihistamine. The treatment provided mild relief.          Past Medical History:   Diagnosis Date    Irritable bowel syndrome with constipation 2017         ROS  Review of Systems   Constitutional:  Negative for fatigue and fever.   HENT: Negative.  Negative for rhinorrhea.    Eyes:  Negative for pain.   Respiratory: Negative.  Negative for shortness of breath.    Cardiovascular: Negative.    Skin:  Positive for rash.   Neurological:  Negative for dizziness and headaches.       PHYSICAL EXAM  Vitals:    25 1910   BP: 125/73   BP Site: Right Upper Arm   Patient Position: Sitting   Pulse: 73   SpO2: 99%   Weight: 90.7 kg (200 lb)     Physical Exam  Constitutional:       General: She is not in acute distress.     Appearance: Normal appearance.   HENT:      Head: Normocephalic.      Nose: Nose normal.   Eyes:      Extraocular Movements: Extraocular movements intact.   Cardiovascular:      Rate and Rhythm: Normal rate.   Pulmonary:      Effort: Pulmonary effort is normal. No respiratory distress.   Abdominal:      Palpations: Abdomen is soft.   Skin:     General: Skin is warm.      Findings: Erythema and rash present. Rash is macular and urticarial.   Neurological:      General: No focal